# Patient Record
Sex: MALE | Race: WHITE | NOT HISPANIC OR LATINO | URBAN - METROPOLITAN AREA
[De-identification: names, ages, dates, MRNs, and addresses within clinical notes are randomized per-mention and may not be internally consistent; named-entity substitution may affect disease eponyms.]

---

## 2018-09-16 ENCOUNTER — EMERGENCY (EMERGENCY)
Facility: HOSPITAL | Age: 53
LOS: 1 days | Discharge: HOME | End: 2018-09-16
Attending: EMERGENCY MEDICINE
Payer: COMMERCIAL

## 2018-09-16 VITALS
DIASTOLIC BLOOD PRESSURE: 93 MMHG | RESPIRATION RATE: 20 BRPM | TEMPERATURE: 98 F | SYSTOLIC BLOOD PRESSURE: 126 MMHG | OXYGEN SATURATION: 98 % | HEART RATE: 63 BPM

## 2018-09-16 VITALS
HEART RATE: 88 BPM | OXYGEN SATURATION: 96 % | WEIGHT: 240.08 LBS | SYSTOLIC BLOOD PRESSURE: 118 MMHG | RESPIRATION RATE: 22 BRPM | DIASTOLIC BLOOD PRESSURE: 82 MMHG | TEMPERATURE: 98 F

## 2018-09-16 DIAGNOSIS — Z98.890 OTHER SPECIFIED POSTPROCEDURAL STATES: Chronic | ICD-10-CM

## 2018-09-16 PROCEDURE — 71046 X-RAY EXAM CHEST 2 VIEWS: CPT

## 2018-09-16 PROCEDURE — 99285 EMERGENCY DEPT VISIT HI MDM: CPT | Mod: 25

## 2018-09-16 PROCEDURE — 71046 X-RAY EXAM CHEST 2 VIEWS: CPT | Mod: 26

## 2018-09-16 PROCEDURE — 99284 EMERGENCY DEPT VISIT MOD MDM: CPT

## 2018-09-16 PROCEDURE — 94640 AIRWAY INHALATION TREATMENT: CPT

## 2018-09-16 RX ORDER — ALBUTEROL 90 UG/1
2.5 AEROSOL, METERED ORAL ONCE
Qty: 0 | Refills: 0 | Status: COMPLETED | OUTPATIENT
Start: 2018-09-16 | End: 2018-09-16

## 2018-09-16 RX ORDER — IPRATROPIUM/ALBUTEROL SULFATE 18-103MCG
3 AEROSOL WITH ADAPTER (GRAM) INHALATION
Qty: 0 | Refills: 0 | Status: COMPLETED | OUTPATIENT
Start: 2018-09-16 | End: 2018-09-16

## 2018-09-16 RX ADMIN — Medication 3 MILLILITER(S): at 09:00

## 2018-09-16 RX ADMIN — Medication 3 MILLILITER(S): at 09:45

## 2018-09-16 RX ADMIN — Medication 3 MILLILITER(S): at 09:43

## 2018-09-16 RX ADMIN — Medication 50 MILLIGRAM(S): at 08:59

## 2018-09-16 RX ADMIN — ALBUTEROL 2.5 MILLIGRAM(S): 90 AEROSOL, METERED ORAL at 11:24

## 2018-09-16 NOTE — ED PROVIDER NOTE - PLAN OF CARE
1. Take prednisone 50 mg for four more days.  2. Take albuterol inhaler as needed & use Advair every day  3. Follow up with pulmonologist [phone]. Return to the emergency department for any trouble breathing or chest pain.

## 2018-09-16 NOTE — ED PROVIDER NOTE - OBJECTIVE STATEMENT
53 y/o hx asthma presents with difficulty breathing x 2 days.    Has been feeling short of breath since yesterday using rescue inhaler every hour. Notes dry cough without fevers/chills or productive sputum. States that has been to emergency room several times for asthma exacerbation but denies admission or hx of intubations. Smoking hx 30 years+. Does not follow with pulmonologist or use maintenance inhaled corticosteroids. No recent travel, pet exposure, sick contacts or exposure to mold/dust. During interview saturating 95% on RA.     PMD: Dr. John Hewitt

## 2018-09-16 NOTE — ED ADULT NURSE NOTE - NSIMPLEMENTINTERV_GEN_ALL_ED
Implemented All Universal Safety Interventions:  [place] to call system. Call bell, personal items and telephone within reach. Instruct patient to call for assistance. Room bathroom lighting operational. Non-slip footwear when patient is off stretcher. Physically safe environment: no spills, clutter or unnecessary equipment. Stretcher in lowest position, wheels locked, appropriate side rails in place.

## 2018-09-16 NOTE — ED ADULT NURSE REASSESSMENT NOTE - COMFORT CARE
side rails up/warm blanket provided/meal provided/treatment delay explained/wait time explained/plan of care explained

## 2018-09-16 NOTE — ED PROVIDER NOTE - PHYSICAL EXAMINATION
GENERAL: speaking in full sentences.   HEAD:  Atraumatic, Normocephalic  EYES: PERRLA, conjunctiva and sclera clear  ENT: MMM; oropharynx clear  NECK: Supple, No JVD  CHEST/LUNG: Diffuse expiratory wheezing.   HEART: Regular rate and rhythm; No murmurs, rubs, or gallops  ABDOMEN: Soft, Nontender, Nondistended; Bowel sounds present  EXTREMITIES:  2+ Peripheral Pulses, No clubbing, cyanosis, or edema  PSYCH: AAOx3  NEUROLOGY: no focal motor or sensory deficits. 5/5 muscle strength in all extremities.   SKIN: No rashes or lesions

## 2018-09-16 NOTE — ED PROVIDER NOTE - MEDICAL DECISION MAKING DETAILS
53 y/o M hx of asthma presents with asthma exacerbation. No hypoxia noted but increased WOB. Will give trial of bronchodilators & PO prednisone, obtain CXR to r/o superimposed PNA & reassess. 53 y/o M hx of asthma presents with asthma exacerbation. No hypoxia noted but increased WOB. Will give trial of bronchodilators & PO prednisone, obtain CXR to r/o superimposed PNA & reassess.    SEGUNDO Vanessa MD: Pt is a 53 y/o male with PMH asthma who p/w c/o difficulty breathing x 2 days. Has been feeling short of breath since yesterday using rescue inhaler every hour without significant relief of sx. Notes dry cough without fevers/chills or productive sputum. States that has been to emergency room several times for asthma exacerbation but denies admission or hx of intubations. Smoking hx 30 years+. Does not follow with pulmonologist or use maintenance inhaled corticosteroids. No recent travel, pet exposure, sick contacts or exposure to mold/dust. During interview saturating 95% on RA. Exam with diffuse wheezing on b/l lung exam without respiratory distress, hypoxia; good air mov't. DDx: asthma exacerbation, COPD exacerbation, PNA. Plan: CXR, steroids, nebs, reassess

## 2018-09-16 NOTE — ED ADULT NURSE NOTE - OBJECTIVE STATEMENT
Patient   is  alert  and  oriented x3.  Color is good and  skin warm to touch.   He  is  c/o  SOB, cough   and  wheezing. He  has  been  afebrile.

## 2018-09-17 RX ORDER — ALBUTEROL 90 UG/1
0 AEROSOL, METERED ORAL
Qty: 0 | Refills: 0 | COMMUNITY

## 2021-11-12 NOTE — ED PROVIDER NOTE - CARE PLAN
Attending with Principal Discharge DX:	Asthma exacerbation  Assessment and plan of treatment:	1. Take prednisone 50 mg for four more days.  2. Take albuterol inhaler as needed & use Advair every day  3. Follow up with pulmonologist [phone]. Return to the emergency department for any trouble breathing or chest pain.

## 2023-03-06 NOTE — ED ADULT NURSE NOTE - NS ED NURSE DC INFO COMPLEXITY
Medical Assessment Completed on: 07-Mar-2023 02:40
Verbalized Understanding/Simple: Patient demonstrates quick and easy understanding

## 2024-01-30 NOTE — ED ADULT TRIAGE NOTE - SOURCE OF INFORMATION
[Normal] : clear lung fields, good air entry, no respiratory distress [Soft] : abdomen soft Spouse [Non Tender] : non-tender [Abnormal Gait] : abnormal gait [Moves all extremities] : moves all extremities [Alert and Oriented] : alert and oriented [de-identified] : obese  [de-identified] : trace edema, varicosites noted

## 2024-02-05 NOTE — ED PROVIDER NOTE - NS_EDPROVIDERDISPOUSERTYPE_ED_A_ED
"Wt Readings from Last 6 Encounters:   02/05/24 110.1 kg (242 lb 11.6 oz)   01/17/24 109.8 kg (242 lb)   01/04/24 110 kg (242 lb 8.1 oz)   12/28/23 110 kg (242 lb 8.1 oz)   11/28/23 111.1 kg (245 lb)   11/16/23 111.5 kg (245 lb 13 oz)     Estimated body mass index is 39.18 kg/m² as calculated from the following:    Height as of this encounter: 5' 6" (1.676 m).    Weight as of this encounter: 110.1 kg (242 lb 11.6 oz).    Therapeutic lifestyle changes encouraged. Education/instructions were reviewed and shared with Karla. Refer to After Visit Summary.   "
ASSESSMENT: This is a chronic problem that appears compensated/controlled and stable on hydroxychloroquine.  PLAN: Continue present treatment plan.   
BP Readings from Last 6 Encounters:   02/05/24 122/80   01/04/24 125/79   12/28/23 136/78   12/27/23 (!) 152/70   11/16/23 (!) 140/80   06/22/23 136/84      Last 5 Patient Entered Readings                Current 30 Day Average:   Recent Readings 8/21/2023 8/21/2023 7/20/2023 7/20/2023 5/30/2023   SBP (mmHg) 149 152 153 139 162   DBP (mmHg) 75 91 100 77 89   Pulse 81 84 86 99 75                 Lab Results   Component Value Date    EGFRNORACEVR >60.0 12/05/2023    CREATININE 0.9 12/05/2023    BUN 22 12/05/2023    K 4.0 12/05/2023     12/05/2023     12/05/2023     Results for orders placed or performed during the hospital encounter of 12/05/23   EKG 12-lead    Collection Time: 12/05/23  1:34 PM    Narrative    Test Reason : Z01.818,    Vent. Rate : 088 BPM     Atrial Rate : 088 BPM     P-R Int : 144 ms          QRS Dur : 078 ms      QT Int : 376 ms       P-R-T Axes : 058 -06 057 degrees     QTc Int : 454 ms    Normal sinus rhythm  Normal ECG  When compared with ECG of 08-JUL-2013 10:15,  No significant change was found  Confirmed by ARMINDA HILLIARD MD ([id number]) on 12/5/2023 2:29:29 PM    Referred By: ESTEFANIA BOB           Confirmed By:ARMINDA HILLIARD MD       
Attending Attestation (For Attendings USE Only)...